# Patient Record
Sex: FEMALE | Race: WHITE | NOT HISPANIC OR LATINO | Employment: FULL TIME | RURAL
[De-identification: names, ages, dates, MRNs, and addresses within clinical notes are randomized per-mention and may not be internally consistent; named-entity substitution may affect disease eponyms.]

---

## 2024-02-16 ENCOUNTER — TELEPHONE (OUTPATIENT)
Dept: CARDIOLOGY | Facility: CLINIC | Age: 54
End: 2024-02-16
Payer: COMMERCIAL

## 2024-02-26 ENCOUNTER — OFFICE VISIT (OUTPATIENT)
Dept: CARDIOLOGY | Facility: CLINIC | Age: 54
End: 2024-02-26
Payer: COMMERCIAL

## 2024-02-26 VITALS — BODY MASS INDEX: 25.87 KG/M2 | OXYGEN SATURATION: 99 % | HEART RATE: 92 BPM | HEIGHT: 63 IN | WEIGHT: 146 LBS

## 2024-02-26 DIAGNOSIS — R06.02 SHORTNESS OF BREATH: Primary | ICD-10-CM

## 2024-02-26 DIAGNOSIS — R01.1 HEART MURMUR: ICD-10-CM

## 2024-02-26 DIAGNOSIS — G47.10 HYPERSOMNIA: ICD-10-CM

## 2024-02-26 DIAGNOSIS — R06.83 SNORING: ICD-10-CM

## 2024-02-26 PROCEDURE — 99204 OFFICE O/P NEW MOD 45 MIN: CPT | Performed by: NURSE PRACTITIONER

## 2024-02-26 PROCEDURE — 93000 ELECTROCARDIOGRAM COMPLETE: CPT | Performed by: NURSE PRACTITIONER

## 2024-02-26 RX ORDER — FEXOFENADINE HCL 180 MG/1
TABLET ORAL
COMMUNITY

## 2024-02-26 RX ORDER — ONDANSETRON 4 MG/1
TABLET, FILM COATED ORAL
COMMUNITY

## 2024-02-26 RX ORDER — LISINOPRIL 20 MG/1
40 TABLET ORAL DAILY
COMMUNITY
Start: 2024-01-30

## 2024-02-26 RX ORDER — CYCLOBENZAPRINE HCL 10 MG
1 TABLET ORAL NIGHTLY PRN
COMMUNITY

## 2024-02-26 RX ORDER — OMEPRAZOLE 20 MG/1
1 CAPSULE, DELAYED RELEASE ORAL DAILY
COMMUNITY
Start: 2024-02-13

## 2024-02-26 RX ORDER — DULOXETIN HYDROCHLORIDE 60 MG/1
CAPSULE, DELAYED RELEASE ORAL
COMMUNITY

## 2024-02-26 NOTE — PROGRESS NOTES
[facility] Provider Note     Date:   2024   Name: Maggi Negrete  :   1970  PCP: Mireille Maloney DO    Chief Complaint   Patient presents with   • Establish Care       Subjective     History of Present Illness  Maggi Negrete is a 53 y.o. female who presents today for new patient evaluation to establish cardiac care.   Patient denies previous history of known cardiac disease.  She was referred here due to family history of premature CAD.  Her father has CAD with stenting, his first stent was placed at age 46.  She also reports that she had a recent gallbladder surgery and the anesthesiologist was concerned about her possibly having sleep apnea.  Her oxygen saturation dropped frequently in the recovery room, requiring oxygen. She has also been told that she snores loudly.  She occasionally naps throughout the day.  She will sometimes wake up in the middle of the night coughing and choking.  She wakes up every morning with a dry mouth and sore throat.  She has never completed a sleep study.  She denies chest pain, palpitations, lower extremity edema, dizziness or syncope.  She reports shortness of breath on exertion.  EKG today shows normal sinus rhythm.  She has not had any recent cardiac testing.    No specialty comments available.     Reports Denies   Chest Pain [] [x]   Shortness of Air [x] []   Palpitations [] [x]   Edema [] [x]   Dizziness [] [x]   Syncope [] [x]       Allergies   Allergen Reactions   • Hydrocodone Headache       Current Outpatient Medications:   •  cyclobenzaprine (FLEXERIL) 10 MG tablet, Take 1 tablet by mouth At Night As Needed., Disp: , Rfl:   •  DULoxetine (CYMBALTA) 60 MG capsule, , Disp: , Rfl:   •  fexofenadine (Allegra Allergy) 180 MG tablet, , Disp: , Rfl:   •  lisinopril (PRINIVIL,ZESTRIL) 20 MG tablet, Take 2 tablets by mouth Daily., Disp: , Rfl:   •  Nabumetone 1000 MG tablet, , Disp: , Rfl:   •  omeprazole (priLOSEC) 20 MG capsule, Take  "1 capsule by mouth Daily., Disp: , Rfl:   •  ondansetron (ZOFRAN) 4 MG tablet, TAKE 1 TABLET BY MOUTH EVERY SIX HOURS AS NEEDED FOR NAUSEA/VOMITING, Disp: , Rfl:     History reviewed. No pertinent past medical history.   Past Surgical History:   Procedure Laterality Date   • GALLBLADDER SURGERY     • GASTRIC BANDING     • KNEE ACL RECONSTRUCTION     • METATARSECTOMY     • SINUS SURGERY     • SKIN CANCER EXCISION       History reviewed. No pertinent family history.  Social History     Socioeconomic History   • Marital status: Single   Tobacco Use   • Smoking status: Never     Passive exposure: Never   • Smokeless tobacco: Never   Vaping Use   • Vaping Use: Never used   Substance and Sexual Activity   • Alcohol use: Never   • Drug use: Never   • Sexual activity: Defer       Objective     Vital Signs:  Pulse 92   Ht 160 cm (63\")   Wt 66.2 kg (146 lb)   SpO2 99%   BMI 25.86 kg/m²   Estimated body mass index is 25.86 kg/m² as calculated from the following:    Height as of this encounter: 160 cm (63\").    Weight as of this encounter: 66.2 kg (146 lb).       BMI is >= 25 and <30. (Overweight) The following options were offered after discussion;: weight loss educational material (shared in after visit summary)      Physical Exam  Constitutional:       Appearance: Normal appearance. She is well-developed.   HENT:      Head: Normocephalic and atraumatic.   Eyes:      Pupils: Pupils are equal, round, and reactive to light.   Neck:      Vascular: No carotid bruit.   Cardiovascular:      Rate and Rhythm: Normal rate and regular rhythm.      Heart sounds: Murmur heard.   Pulmonary:      Breath sounds: Normal breath sounds. No wheezing or rhonchi.   Musculoskeletal:      Right lower leg: No edema.      Left lower leg: No edema.   Skin:     Capillary Refill: Capillary refill takes less than 2 seconds.      Coloration: Skin is not cyanotic.      Nails: There is no clubbing.   Neurological:      Mental Status: She is alert and " oriented to person, place, and time.      Motor: No weakness.      Gait: Gait normal.   Psychiatric:         Mood and Affect: Mood normal.         Behavior: Behavior is cooperative.         Thought Content: Thought content normal.         Cognition and Memory: Memory normal.                 ECG 12 Lead    Date/Time: 2/27/2024 8:33 AM  Performed by: Annalisa Alexander APRN    Authorized by: Annalisa Alexander APRN  Comparison: not compared with previous ECG   Previous ECG: no previous ECG available  Rhythm: sinus rhythm  Rate: normal  BPM: 60  QRS axis: normal    Clinical impression: normal ECG           Assessment and Plan     Diagnoses and all orders for this visit:    1. Shortness of breath (Primary)  Assessment & Plan:  Patient reports shortness of breath on exertion.  She has a family history of premature CAD.  Her father has CAD with stenting at age 46.    - Stress echocardiogram to rule out ischemia    Orders:  -     Adult Transthoracic Echo Complete W/ Cont if Necessary Per Protocol; Future  -     Adult Stress Echo W/ Cont or Stress Agent if Necessary Per Protocol; Future    2. Heart murmur  Assessment & Plan:  II/VI systolic murmur noted on exam.  - Echocardiogram for further evaluation.    Orders:  -     Adult Transthoracic Echo Complete W/ Cont if Necessary Per Protocol; Future    3. Snoring  Assessment & Plan:  Home sleep study for further evaluation.    Orders:  -     Home Sleep Study; Future    4. Hypersomnia  -     Home Sleep Study; Future        Recommendations: Report if any new/changing symptoms immediately          Follow Up  Return in about 4 weeks (around 3/25/2024) for cardiac testing results.  Patient was given instructions and counseling regarding her condition or for health maintenance advice. Please see specific information pulled into the AVS if appropriate.

## 2024-02-27 PROBLEM — R06.83 SNORING: Status: ACTIVE | Noted: 2024-02-27

## 2024-02-27 PROBLEM — G47.10 HYPERSOMNIA: Status: ACTIVE | Noted: 2024-02-27

## 2024-02-27 PROBLEM — R01.1 HEART MURMUR: Status: ACTIVE | Noted: 2024-02-27

## 2024-02-27 PROBLEM — R06.02 SHORTNESS OF BREATH: Status: ACTIVE | Noted: 2024-02-27

## 2024-02-27 PROCEDURE — 93000 ELECTROCARDIOGRAM COMPLETE: CPT | Performed by: NURSE PRACTITIONER

## 2024-02-27 NOTE — ASSESSMENT & PLAN NOTE
Patient reports shortness of breath on exertion.  She has a family history of premature CAD.  Her father has CAD with stenting at age 46.    - Stress echocardiogram to rule out ischemia

## 2024-03-01 ENCOUNTER — PATIENT ROUNDING (BHMG ONLY) (OUTPATIENT)
Dept: CARDIOLOGY | Facility: CLINIC | Age: 54
End: 2024-03-01
Payer: COMMERCIAL

## 2024-03-01 NOTE — PROGRESS NOTES
March 1, 2024    Hello, may I speak with Maggi Negrete?    My name is ROLF       I am  with [facility]  [facility]  [address] [place] [id number] [phone].    Before we get started may I verify your date of birth? 1970    I am calling to officially welcome you to our practice and ask about your recent visit. Is this a good time to talk? No, LVM  Tell me about your visit with us. What things went well?         We're always looking for ways to make our patients' experiences even better. Do you have recommendations on ways we may improve?      Overall were you satisfied with your first visit to our practice?        I appreciate you taking the time to speak with me today. Is there anything else I can do for you?       Thank you, and have a great day.

## 2024-03-22 DIAGNOSIS — G47.10 HYPERSOMNIA: ICD-10-CM

## 2024-03-22 DIAGNOSIS — R06.83 SNORING: ICD-10-CM

## 2024-03-26 ENCOUNTER — TELEPHONE (OUTPATIENT)
Dept: CARDIOLOGY | Facility: CLINIC | Age: 54
End: 2024-03-26
Payer: COMMERCIAL

## 2024-03-26 DIAGNOSIS — G47.33 OBSTRUCTIVE SLEEP APNEA: Primary | ICD-10-CM

## 2024-03-26 NOTE — TELEPHONE ENCOUNTER
Please call patient and let her know that the home sleep study showed mild ISAIAS with AHI of 5.1, supine AHI was 5.3. We can discuss this in further detail at her follow up visit or I can go ahead and send a PAP order in. Please let me know what she would like to do.

## 2024-04-03 ENCOUNTER — OFFICE VISIT (OUTPATIENT)
Dept: CARDIOLOGY | Facility: CLINIC | Age: 54
End: 2024-04-03
Payer: COMMERCIAL

## 2024-04-03 VITALS
SYSTOLIC BLOOD PRESSURE: 140 MMHG | DIASTOLIC BLOOD PRESSURE: 76 MMHG | HEIGHT: 63 IN | BODY MASS INDEX: 44.03 KG/M2 | OXYGEN SATURATION: 95 % | WEIGHT: 248.5 LBS | HEART RATE: 93 BPM

## 2024-04-03 DIAGNOSIS — R06.02 SHORTNESS OF BREATH: ICD-10-CM

## 2024-04-03 DIAGNOSIS — I10 ESSENTIAL HYPERTENSION: ICD-10-CM

## 2024-04-03 DIAGNOSIS — J45.909 REACTIVE AIRWAY DISEASE WITHOUT COMPLICATION, UNSPECIFIED ASTHMA SEVERITY, UNSPECIFIED WHETHER PERSISTENT: ICD-10-CM

## 2024-04-03 DIAGNOSIS — G47.33 OSA (OBSTRUCTIVE SLEEP APNEA): ICD-10-CM

## 2024-04-03 DIAGNOSIS — R05.3 CHRONIC COUGH: ICD-10-CM

## 2024-04-03 DIAGNOSIS — R06.02 SHORTNESS OF BREATH: Primary | ICD-10-CM

## 2024-04-03 PROBLEM — M06.9 RHEUMATOID ARTHRITIS: Status: ACTIVE | Noted: 2023-01-10

## 2024-04-03 PROBLEM — K21.9 GASTROESOPHAGEAL REFLUX DISEASE: Status: ACTIVE | Noted: 2023-01-10

## 2024-04-03 PROBLEM — N60.19 FIBROCYSTIC DISEASE OF BREAST: Status: ACTIVE | Noted: 2023-01-10

## 2024-04-03 PROBLEM — E03.9 HYPOTHYROIDISM: Status: ACTIVE | Noted: 2023-01-10

## 2024-04-03 PROBLEM — E66.9 OBESITY: Status: ACTIVE | Noted: 2023-01-10

## 2024-04-03 PROBLEM — Z80.3 FAMILY HISTORY OF BREAST CANCER: Status: ACTIVE | Noted: 2023-01-10

## 2024-04-03 PROBLEM — F32.A DEPRESSIVE DISORDER: Status: ACTIVE | Noted: 2023-01-10

## 2024-04-03 PROBLEM — G43.909 MIGRAINE: Status: ACTIVE | Noted: 2023-01-10

## 2024-04-03 PROBLEM — I73.00 RAYNAUD'S DISEASE: Status: ACTIVE | Noted: 2023-01-10

## 2024-04-03 PROCEDURE — 99214 OFFICE O/P EST MOD 30 MIN: CPT | Performed by: NURSE PRACTITIONER

## 2024-04-03 RX ORDER — LISINOPRIL 40 MG/1
40 TABLET ORAL DAILY
COMMUNITY

## 2024-04-03 RX ORDER — METOPROLOL SUCCINATE 25 MG/1
25 TABLET, EXTENDED RELEASE ORAL DAILY
Qty: 90 TABLET | Refills: 0 | Status: SHIPPED | OUTPATIENT
Start: 2024-04-03

## 2024-04-03 NOTE — PROGRESS NOTES
[facility] Provider Note     Date:   2024   Name: Maggi Negrete  :   1970  PCP: Mireille Maloney DO    Chief Complaint   Patient presents with   • Shortness of Breath     Patient here for exercise stress test, target HR met. Only symptom shortness of breath. Patient states she is going to pick her CPAP one day this week.        Subjective     History of Present Illness  Maggi Negrete is a 53 y.o. female who presents today for follow-up on shortness of air, hypertension, family history of CAD.  Patient's 4/3/2024 GXT low risk.  She does not have any chest pain but does have shortness of breath and chronic cough at night.  We discussed that chronic cough at night is often asthma.  Patient does have reactive airway listed in epic the patient does not recall ever being diagnosed with asthma.  She is willing to do a chest x-ray and PFTs.    Echo with mild pulmonary hypertension.  We discussed that this could be related to weight, blood pressure, ISAIAS, or underlying lung issue.  Once again organ to check PFTs and chest x-ray.  Will also work on getting blood pressure under better control.  No meds were held for GXT today and patient's blood pressure is 140/76.  Patient said yesterday it was 170 systolically.  For now we will add metoprolol 25 mg daily.  I did tell patient to let us know if she feels like the metoprolol worsens her shortness of air given her possible asthma.    She plans to  her PAP machine this week.  She will need a 31 to 90-day ISAIAS follow-up.    We will have her come back in about 2 months with Annalisa.    1.  Shortness of air  2.  Family history of CAD  3.  ISAIAS  4.  Mild pulmonary hypertension  5.  Hypertension  6.  Chronic cough at night    4/3/2024 GXT-low risk    3/26/2024 HST-AHI 5.1 oxygen desat not very long but did get down to 63%.    3/12/2024 echo-normal EF, LVH, grade 1 diastolic dysfunction, mild pulmonary hypertension.    Allergies  "  Allergen Reactions   • Hydrocodone Headache       Current Outpatient Medications:   •  cyclobenzaprine (FLEXERIL) 10 MG tablet, Take 1 tablet by mouth At Night As Needed., Disp: , Rfl:   •  DULoxetine (CYMBALTA) 60 MG capsule, Take 1 capsule by mouth Daily., Disp: , Rfl:   •  fexofenadine (Allegra Allergy) 180 MG tablet, Take 1 tablet by mouth Daily., Disp: , Rfl:   •  lisinopril (PRINIVIL,ZESTRIL) 40 MG tablet, Take 1 tablet by mouth Daily., Disp: , Rfl:   •  Nabumetone 1000 MG tablet, Take 1 tablet by mouth 2 (Two) Times a Day., Disp: , Rfl:   •  omeprazole (priLOSEC) 20 MG capsule, Take 1 capsule by mouth Daily., Disp: , Rfl:   •  ondansetron (ZOFRAN) 4 MG tablet, TAKE 1 TABLET BY MOUTH EVERY SIX HOURS AS NEEDED FOR NAUSEA/VOMITING, Disp: , Rfl:   •  metoprolol succinate XL (TOPROL-XL) 25 MG 24 hr tablet, Take 1 tablet by mouth Daily., Disp: 90 tablet, Rfl: 0    History reviewed. No pertinent past medical history.   Past Surgical History:   Procedure Laterality Date   • GALLBLADDER SURGERY     • GASTRIC BANDING     • KNEE ACL RECONSTRUCTION     • METATARSECTOMY     • SINUS SURGERY     • SKIN CANCER EXCISION       History reviewed. No pertinent family history.  Social History     Socioeconomic History   • Marital status: Single   Tobacco Use   • Smoking status: Never     Passive exposure: Never   • Smokeless tobacco: Never   Vaping Use   • Vaping status: Never Used   Substance and Sexual Activity   • Alcohol use: Never   • Drug use: Never   • Sexual activity: Defer       Objective     Vital Signs:  /76 (BP Location: Right arm, Patient Position: Sitting, Cuff Size: Adult)   Pulse 93   Ht 160 cm (63\")   Wt 113 kg (248 lb 8 oz)   SpO2 95%   BMI 44.02 kg/m²   Estimated body mass index is 44.02 kg/m² as calculated from the following:    Height as of this encounter: 160 cm (63\").    Weight as of this encounter: 113 kg (248 lb 8 oz).         Physical Exam  Constitutional:       Appearance: Normal appearance. " She is well-developed.   HENT:      Head: Normocephalic and atraumatic.   Eyes:      General: No scleral icterus.     Pupils: Pupils are equal, round, and reactive to light.   Neck:      Vascular: No carotid bruit.   Cardiovascular:      Rate and Rhythm: Normal rate and regular rhythm.      Pulses: Normal pulses.           Radial pulses are 2+ on the right side and 2+ on the left side.        Dorsalis pedis pulses are 2+ on the right side and 2+ on the left side.        Posterior tibial pulses are 2+ on the right side and 2+ on the left side.      Heart sounds: Normal heart sounds. No murmur heard.  Pulmonary:      Breath sounds: Normal breath sounds. No wheezing or rhonchi.   Musculoskeletal:         General: Normal range of motion.      Right lower leg: No edema.      Left lower leg: No edema.   Skin:     Capillary Refill: Capillary refill takes less than 2 seconds.      Coloration: Skin is not cyanotic.      Nails: There is no clubbing.   Neurological:      Mental Status: She is alert and oriented to person, place, and time.      Motor: No weakness.      Gait: Gait normal.   Psychiatric:         Mood and Affect: Mood normal.         Behavior: Behavior is cooperative.         Thought Content: Thought content normal.         Cognition and Memory: Memory normal.                     Assessment and Plan     Diagnoses and all orders for this visit:    1. Shortness of breath  Comments:  Check PFT and chest x-ray.  Cardiac testing low risk.  Weight loss and treating ISAIAS may help as well.  Orders:  -     XR Chest PA & Lateral; Future  -     Complete PFT - Pre & Post Bronchodilator; Future    2. Chronic cough  Comments:  At night.  History of reactive airway on chart.  Makes me suspicious for asthma.  Check PFTs.  Orders:  -     XR Chest PA & Lateral; Future  -     Complete PFT - Pre & Post Bronchodilator; Future    3. Essential hypertension  Comments:  Not at goal.  No meds held today for GXT.  Add metoprolol.  Advised  patient to let us know if it makes her shortness of air worse.    4. Reactive airway disease without complication, unspecified asthma severity, unspecified whether persistent  Comments:  Check PFTs and chest x-ray.  Asthma?    5. ISAIAS (obstructive sleep apnea)  Comments:  Patient plans to  her machine this week.  Will have her come back in 2 months for 31 to 90-day.  Tx ISAIAS may help shortness of air and HTN    Other orders  -     metoprolol succinate XL (TOPROL-XL) 25 MG 24 hr tablet; Take 1 tablet by mouth Daily.  Dispense: 90 tablet; Refill: 0        Recommendations: ER if symptoms increase, Report if any new/changing symptoms immediately, Limitations of stress testing for definitive diagnosis reviewed, and Limit salt      Follow Up  Return in about 2 months (around 6/3/2024) for PFT/CXR/SOA/BP/31-90 day ISAIAS with Annalisa.    Ava Page, APRN   04/03/2024     Please note that this explicitly excludes time spent on other separate billable services such as performing procedures or test interpretation, when applicable.    This note was created using dictation software which occasionally transcribes nonsensical phrases. Please contact the provider if any clarification is needed.

## 2024-04-19 ENCOUNTER — OUTSIDE FACILITY SERVICE (OUTPATIENT)
Dept: CARDIOLOGY | Facility: CLINIC | Age: 54
End: 2024-04-19
Payer: COMMERCIAL

## 2024-04-19 DIAGNOSIS — R05.3 CHRONIC COUGH: ICD-10-CM

## 2024-04-19 DIAGNOSIS — R06.02 SHORTNESS OF BREATH: Primary | ICD-10-CM

## 2024-04-19 DIAGNOSIS — R94.2 ABNORMAL PFT: ICD-10-CM

## 2024-04-19 DIAGNOSIS — R06.02 SHORTNESS OF BREATH: ICD-10-CM

## 2024-04-24 ENCOUNTER — TELEPHONE (OUTPATIENT)
Dept: CARDIOLOGY | Facility: CLINIC | Age: 54
End: 2024-04-24

## 2024-04-24 DIAGNOSIS — R06.02 SHORTNESS OF BREATH: ICD-10-CM

## 2024-04-24 DIAGNOSIS — R05.3 CHRONIC COUGH: ICD-10-CM

## 2024-04-24 NOTE — TELEPHONE ENCOUNTER
Caller: Maggi Negrete    Relationship: Self    Best call back number: [phone]     What is the best time to reach you: ANYTIME     What was the call regarding:   GOT A CALL LAST WEEK ABOUT A REF BEING PUT IN TO [facility], STATES SHE WAS TOLD WE WOULD CALL HER RIGHT BACK AND SHE HAS NOT RECEIVED  A CALL BACK YET TO DISCUSS. PLEASE ADVISE.     ALSO REQUESTING THE RESULTS FROM THE XRAY THAT WAS COMPLETED OVER A WEEK AGO, DONE AT THE [facility] [place]. PLEASE ADVISE.     Is it okay if the provider responds through MyChart: EITHER

## 2024-04-24 NOTE — TELEPHONE ENCOUNTER
Patient called back for xray results they was given. However she would like more in depth details about her PFT because is going to be so far out before she gets to see Pulmonary. Please advise.

## 2024-04-24 NOTE — TELEPHONE ENCOUNTER
Called [facility] to call back for results     HUB okay to relay ... Normal Chest Xray.

## 2024-05-23 ENCOUNTER — TELEPHONE (OUTPATIENT)
Dept: CARDIOLOGY | Facility: CLINIC | Age: 54
End: 2024-05-23
Payer: COMMERCIAL

## 2024-05-23 NOTE — TELEPHONE ENCOUNTER
"Patient called regarding PFT she had done and referral to pulmonary. Patient states she has an appt scheduled with them on 9/10/24 but asked if there is any kind of inhaler or something we can give her in the meantime until she sees them. Patient states she has been having frequent \"coughing fits\" and would like something to help. She contacted her PCP and they told her to call us since we were the ones who ordered the PFT. Please advise.   "

## 2024-05-24 RX ORDER — ALBUTEROL SULFATE 90 UG/1
2 AEROSOL, METERED RESPIRATORY (INHALATION) EVERY 4 HOURS PRN
Qty: 18 G | Refills: 3 | Status: SHIPPED | OUTPATIENT
Start: 2024-05-24

## 2024-05-24 NOTE — TELEPHONE ENCOUNTER
Called patient, no answer. Left message informing her that practitioner had sent in medication to her pharmacy for her to use as needed and to call back if any questions.

## 2024-06-03 ENCOUNTER — OFFICE VISIT (OUTPATIENT)
Dept: CARDIOLOGY | Facility: CLINIC | Age: 54
End: 2024-06-03
Payer: COMMERCIAL

## 2024-06-03 VITALS
SYSTOLIC BLOOD PRESSURE: 132 MMHG | HEART RATE: 105 BPM | HEIGHT: 63 IN | OXYGEN SATURATION: 99 % | WEIGHT: 248 LBS | BODY MASS INDEX: 43.94 KG/M2 | DIASTOLIC BLOOD PRESSURE: 86 MMHG

## 2024-06-03 DIAGNOSIS — I10 ESSENTIAL HYPERTENSION: ICD-10-CM

## 2024-06-03 DIAGNOSIS — G47.33 OBSTRUCTIVE SLEEP APNEA: Primary | ICD-10-CM

## 2024-06-03 PROCEDURE — 99213 OFFICE O/P EST LOW 20 MIN: CPT | Performed by: NURSE PRACTITIONER

## 2024-06-03 RX ORDER — NABUMETONE 500 MG/1
2 TABLET, FILM COATED ORAL EVERY 12 HOURS SCHEDULED
COMMUNITY
Start: 2024-05-23

## 2024-06-03 NOTE — ASSESSMENT & PLAN NOTE
Patient is here today for 31-90-day compliance visit. Download reviewed and interpreted today.  Compliance is 93%, when used >4 hours is 70%.  Average use per night is 5 hours and 23 minutes.  AHI 0.9.  Mask leak noted on download.  Patient has noticed an improvement in daytime fatigue.  She is benefiting from PAP therapy and we will continue.  - Change pressure settings to 6-16 cm  - Follow-up in 6 months

## 2024-06-03 NOTE — PROGRESS NOTES
Follow-Up Sleep Consult     Date:   2024  Name: Maggi Negrete  :   1970  PCP: Mireille Maloney DO    Chief Complaint   Patient presents with    Sleep Apnea     31-90 day       Subjective     History of Present Illness  Maggi Negrete is a 53 y.o. female who presents today for follow-up on ISAIAS.  Patient is here today for 31-90-day compliance visit.  Download reviewed and interpreted today.  Compliance is 93%, when used >4 hours is 70%.  Average use per night is 5 hours and 23 minutes.  AHI 0.9.  She is using a fullface mask and doing well with that.  Airflow is comfortable.  Mask leak noted on download.  She denies excessive daytime sleepiness or fatigue.  She has noticed a significant improvement in daytime fatigue.  She has also noticed decreased episodes of coughing during sleep.  She denies any new cardiac symptoms or concerns today.  Overall, patient is doing well.    1.  Shortness of air  2.  Family history of CAD  3.  ISAIAS  4.  Mild pulmonary hypertension  5.  Hypertension  6.  Chronic cough at night    04/10/2024 PFT - minimal airway obstruction present suggesting small airway disease.    4/3/2024 GXT-low risk    3/26/2024 HST-AHI 5.1 oxygen desat not very long but did get down to 63%.    3/12/2024 echo-normal EF, LVH, grade 1 diastolic dysfunction, mild pulmonary hypertension.    History of ISAIAS with a baseline AHI of 5.1.     Current Treatment: AutoCPAP 6-20cm  Current mask used is fullface mask    Device Functioning Well: Yes  Mask Fit Comfortable: Yes  Air Flow Comfortable: Yes  DME Helpful for Supplies: Yes  Sleep is rested: Yes        Device Download:                 The patient's relevant past medical, surgical, family, and social history reviewed and updated in Epic as appropriate.    Past Medical History:   Diagnosis Date    Sleep apnea      Past Surgical History:   Procedure Laterality Date    GALLBLADDER SURGERY      GASTRIC BANDING      KNEE ACL RECONSTRUCTION      METATARSECTOMY  "     SINUS SURGERY      SKIN CANCER EXCISION       OB History    No obstetric history on file.       Allergies   Allergen Reactions    Hydrocodone Headache     Prior to Admission medications    Medication Sig Start Date End Date Taking? Authorizing Provider   albuterol sulfate  (90 Base) MCG/ACT inhaler Inhale 2 puffs Every 4 (Four) Hours As Needed for Wheezing or Shortness of Air (coughing). 5/24/24  Yes Ava Page APRN   cyclobenzaprine (FLEXERIL) 10 MG tablet Take 1 tablet by mouth At Night As Needed.   Yes Antoni Vora MD   DULoxetine (CYMBALTA) 60 MG capsule Take 1 capsule by mouth Daily.   Yes Antoni Vora MD   fexofenadine (Allegra Allergy) 180 MG tablet Take 1 tablet by mouth Daily.   Yes Antoni Vora MD   lisinopril (PRINIVIL,ZESTRIL) 40 MG tablet Take 1 tablet by mouth Daily.   Yes Antoni Vora MD   metoprolol succinate XL (TOPROL-XL) 25 MG 24 hr tablet Take 1 tablet by mouth Daily. 4/3/24  Yes Ava Page APRN   nabumetone (RELAFEN) 500 MG tablet Take 2 tablets by mouth Every 12 (Twelve) Hours. 5/23/24  Yes Antoni Vora MD   omeprazole (priLOSEC) 20 MG capsule Take 1 capsule by mouth Daily. 2/13/24  Yes Antoni Vora MD   Nabumetone 1000 MG tablet Take 1 tablet by mouth 2 (Two) Times a Day.  6/3/24  Antoni Vora MD   ondansetron (ZOFRAN) 4 MG tablet TAKE 1 TABLET BY MOUTH EVERY SIX HOURS AS NEEDED FOR NAUSEA/VOMITING  6/3/24  Antoni Vora MD     History reviewed. No pertinent family history.    Objective     Vital Signs:  /86   Pulse 105   Ht 160 cm (63\")   Wt 112 kg (248 lb)   SpO2 99%   BMI 43.93 kg/m²              Physical Exam  Vitals reviewed.   Constitutional:       Appearance: Normal appearance.   HENT:      Head: Normocephalic.   Musculoskeletal:      Right lower leg: No edema.      Left lower leg: No edema.   Skin:     General: Skin is warm and dry.      Capillary Refill: " Capillary refill takes less than 2 seconds.   Neurological:      General: No focal deficit present.      Mental Status: She is alert and oriented to person, place, and time.   Psychiatric:         Mood and Affect: Mood normal.         Behavior: Behavior normal.             PAP download reviewed: Most recent download reviewed         Assessment and Plan     Diagnoses and all orders for this visit:    1. Obstructive sleep apnea (Primary)  Assessment & Plan:  Patient is here today for 31-90-day compliance visit. Download reviewed and interpreted today.  Compliance is 93%, when used >4 hours is 70%.  Average use per night is 5 hours and 23 minutes.  AHI 0.9.  Mask leak noted on download.  Patient has noticed an improvement in daytime fatigue.  She is benefiting from PAP therapy and we will continue.  - Change pressure settings to 6-16 cm  - Follow-up in 6 months    Orders:  -     PAP Therapy    2. Essential hypertension  Assessment & Plan:  Hypertension is stable and controlled  Continue current treatment regimen.  Regular aerobic exercise.  Blood pressure will be reassessed in 6 months.          Report if any new/changing symptoms immediately         Follow Up  Return in about 6 months (around 12/3/2024) for ISAIAS.  Patient was given instructions and counseling regarding her condition or for health maintenance advice. Please see specific information pulled into the AVS if appropriate.

## 2024-09-05 ENCOUNTER — HOSPITAL ENCOUNTER (OUTPATIENT)
Dept: HOSPITAL 22 - RT | Age: 54
Discharge: HOME | End: 2024-09-05
Payer: COMMERCIAL

## 2024-09-05 DIAGNOSIS — R06.00: Primary | ICD-10-CM

## 2024-09-05 PROCEDURE — 94070 EVALUATION OF WHEEZING: CPT

## 2024-09-05 PROCEDURE — 95070 INHLJ BRNCL CHALLENGE TSTG: CPT

## 2024-09-05 RX ADMIN — ALBUTEROL SULFATE 2.5 MG: 2.5 SOLUTION RESPIRATORY (INHALATION) at 15:46

## 2024-09-05 RX ADMIN — METHACHOLINE CHLORIDE INHALATION SOLUTION 64 MG: KIT at 15:46

## 2024-09-30 ENCOUNTER — TELEPHONE (OUTPATIENT)
Dept: CARDIOLOGY | Facility: CLINIC | Age: 54
End: 2024-09-30
Payer: COMMERCIAL

## 2024-09-30 NOTE — TELEPHONE ENCOUNTER
LVM FOR PT TO CALL TO LET US KNOW IF SHE HAS SEEN A PULMONOLOGIST ALREADY OR IF THE 6.20.2025 APPT DATE IS CORRECT AS THAT WILL BE 14MONTHS OUT FROM THE DATE OF THE REFERRAL FROM OUR OFFICE.  IF PT CALLS BACK HUB OK TO RELAY AND SENT TO ME.

## 2025-01-03 ENCOUNTER — OFFICE VISIT (OUTPATIENT)
Dept: CARDIOLOGY | Facility: CLINIC | Age: 55
End: 2025-01-03
Payer: COMMERCIAL

## 2025-01-03 VITALS
OXYGEN SATURATION: 97 % | DIASTOLIC BLOOD PRESSURE: 68 MMHG | WEIGHT: 252 LBS | SYSTOLIC BLOOD PRESSURE: 128 MMHG | HEART RATE: 58 BPM | BODY MASS INDEX: 44.65 KG/M2 | HEIGHT: 63 IN

## 2025-01-03 DIAGNOSIS — I10 ESSENTIAL HYPERTENSION: ICD-10-CM

## 2025-01-03 DIAGNOSIS — G47.33 OBSTRUCTIVE SLEEP APNEA: Primary | ICD-10-CM

## 2025-01-03 PROCEDURE — 99213 OFFICE O/P EST LOW 20 MIN: CPT | Performed by: NURSE PRACTITIONER

## 2025-01-03 NOTE — PROGRESS NOTES
Follow-Up Sleep Consult     Date:   2025  Name: Maggi Negrete  :   1970  PCP: Mireille Maloney DO    Chief Complaint   Patient presents with    Sleep Apnea     Hasn't used PAP machine since Sept.       Subjective     History of Present Illness  Maggi Negrete is a 54 y.o. female who presents today for follow-up on ISAIAS. She has a history of mild ISAIAS with baseline AHI of 5.1. She reports that she stopped using her CPAP several months ago due to the mask making her feel like she was smothering. She also reports that she started a new medication for asthma and she is not waking up coughing like she previously was. She is feeling better than she has in a long time. She denies excessive daytime sleepiness or fatigue. She denies any new cardiac symptoms. She is willing to try a nasal mask and restart PAP therapy.     1.  Shortness of air  2.  Family history of CAD  3.  ISAIAS  4.  Mild pulmonary hypertension  5.  Hypertension  6.  Chronic cough at night    04/10/2024 PFT - minimal airway obstruction present suggesting small airway disease.    4/3/2024 GXT-low risk    3/26/2024 HST-AHI 5.1 oxygen desat not very long but did get down to 63%.    3/12/2024 echo-normal EF, LVH, grade 1 diastolic dysfunction, mild pulmonary hypertension.    History of ISAIAS with a baseline AHI of 5.1.       The patient's relevant past medical, surgical, family, and social history reviewed and updated in Epic as appropriate.    Past Medical History:   Diagnosis Date    Sleep apnea      Past Surgical History:   Procedure Laterality Date    GALLBLADDER SURGERY      GASTRIC BANDING      KNEE ACL RECONSTRUCTION      METATARSECTOMY      SINUS SURGERY      SKIN CANCER EXCISION       OB History    No obstetric history on file.       Allergies   Allergen Reactions    Hydrocodone Headache     Prior to Admission medications    Medication Sig Start Date End Date Taking? Authorizing Provider   albuterol sulfate  (90 Base) MCG/ACT  "inhaler Inhale 2 puffs Every 4 (Four) Hours As Needed for Wheezing or Shortness of Air (coughing). 5/24/24  Yes Ava Page APRN   cyclobenzaprine (FLEXERIL) 10 MG tablet Take 1 tablet by mouth At Night As Needed.   Yes Antoni Vora MD   DULoxetine (CYMBALTA) 60 MG capsule Take 1 capsule by mouth Daily.   Yes Antoni Vora MD   fexofenadine (Allegra Allergy) 180 MG tablet Take 1 tablet by mouth Daily.   Yes Antoni Vora MD   lisinopril (PRINIVIL,ZESTRIL) 40 MG tablet Take 1 tablet by mouth Daily.   Yes Antoni Vora MD   metoprolol succinate XL (TOPROL-XL) 25 MG 24 hr tablet Take 1 tablet by mouth Daily. 4/3/24  Yes Ava Page APRN   nabumetone (RELAFEN) 500 MG tablet Take 2 tablets by mouth Every 12 (Twelve) Hours. 5/23/24  Yes Antoni Vora MD   omeprazole (priLOSEC) 20 MG capsule Take 1 capsule by mouth Daily. 2/13/24  Yes Antoni Vora MD     History reviewed. No pertinent family history.    Objective     Vital Signs:  /68   Pulse 58   Ht 160 cm (63\")   Wt 114 kg (252 lb)   SpO2 97%   BMI 44.64 kg/m²              Physical Exam  Vitals reviewed.   Constitutional:       Appearance: Normal appearance.   HENT:      Head: Normocephalic.   Cardiovascular:      Rate and Rhythm: Normal rate and regular rhythm.      Heart sounds: Normal heart sounds.   Pulmonary:      Effort: Pulmonary effort is normal.      Breath sounds: Normal breath sounds.   Musculoskeletal:      Right lower leg: No edema.      Left lower leg: No edema.   Skin:     General: Skin is warm and dry.      Capillary Refill: Capillary refill takes less than 2 seconds.   Neurological:      General: No focal deficit present.      Mental Status: She is alert and oriented to person, place, and time.   Psychiatric:         Mood and Affect: Mood normal.         Behavior: Behavior normal.                      Assessment and Plan     Diagnoses and all orders for this " visit:    1. Obstructive sleep apnea (Primary)  Assessment & Plan:  AHI of 5.1. She reports that she stopped using her CPAP several months ago due to the mask making her feel like she was smothering. She is feeling better than she has in a long time. She denies excessive daytime sleepiness or fatigue. She is willing to try a nasal mask and restart PAP therapy.     -Trial of nasal mask  -Follow up in 3 months to check download.      Orders:  -     PAP Therapy    2. Essential hypertension  Assessment & Plan:  Hypertension is stable and controlled  Continue current treatment regimen.  Regular aerobic exercise.  Blood pressure will be reassessed in 6 months.          Report if any new/changing symptoms immediately, Sleep risks reviewed (driving, medical, sleep death, sedating agents), and Increase pap therapy usage         Follow Up  Return in about 3 months (around 4/3/2025) for ISAIAS.  Patient was given instructions and counseling regarding her condition or for health maintenance advice. Please see specific information pulled into the AVS if appropriate.

## 2025-01-03 NOTE — ASSESSMENT & PLAN NOTE
AHI of 5.1. She reports that she stopped using her CPAP several months ago due to the mask making her feel like she was smothering. She is feeling better than she has in a long time. She denies excessive daytime sleepiness or fatigue. She is willing to try a nasal mask and restart PAP therapy.     -Trial of nasal mask  -Follow up in 3 months to check download.

## 2025-02-17 RX ORDER — METOPROLOL SUCCINATE 25 MG/1
25 TABLET, EXTENDED RELEASE ORAL DAILY
Qty: 90 TABLET | Refills: 1 | Status: SHIPPED | OUTPATIENT
Start: 2025-02-17